# Patient Record
Sex: FEMALE | Race: ASIAN | NOT HISPANIC OR LATINO
[De-identification: names, ages, dates, MRNs, and addresses within clinical notes are randomized per-mention and may not be internally consistent; named-entity substitution may affect disease eponyms.]

---

## 2019-01-15 ENCOUNTER — APPOINTMENT (OUTPATIENT)
Dept: ORTHOPEDIC SURGERY | Facility: CLINIC | Age: 74
End: 2019-01-15
Payer: MEDICARE

## 2019-01-15 VITALS
HEART RATE: 73 BPM | SYSTOLIC BLOOD PRESSURE: 133 MMHG | HEIGHT: 63 IN | WEIGHT: 142 LBS | BODY MASS INDEX: 25.16 KG/M2 | DIASTOLIC BLOOD PRESSURE: 69 MMHG

## 2019-01-15 DIAGNOSIS — M65.341 TRIGGER FINGER, RIGHT RING FINGER: ICD-10-CM

## 2019-01-15 PROCEDURE — 99204 OFFICE O/P NEW MOD 45 MIN: CPT | Mod: 25

## 2019-01-15 PROCEDURE — 20550 NJX 1 TENDON SHEATH/LIGAMENT: CPT | Mod: RT

## 2019-01-15 NOTE — HISTORY OF PRESENT ILLNESS
[FreeTextEntry1] : the patient is a 73-year-old female who presents with pain and loss of motion of the right ring finger. She states that her symptoms began approximately a year and half ago and she received one cortisone injection at the A1 pulley which providedsignificant relief approximately 1 year. Currently she experiences a dull pain at the A1 pulley the radius the PIP joint. She is unable to make a full fist due to pain and stiffness.

## 2019-01-15 NOTE — PROCEDURE
[FreeTextEntry1] : My impression is that this patient has stenosing tenosynovitis of the finger, more commonly known as a trigger finger.  I recommended that the patient undergo a trigger finger injection. The risks, benefits, and alternatives were discussed with the patient in detail. This included (but was not limited to) pain, infection, subcutaneous atrophy, skin depigmentation, etc... They agreed to undergo the steroid injection. Under informed consent and sterile conditions, 1/2 cc of 1% plain lidocaine  and 1 cc of Dexamethasone 4mg was precisely injected into the patient's right ring finger.   A sterile Band-Aid was placed.  It is my hope that this significantly alleviates the patient's symptoms.\par \par

## 2019-01-15 NOTE — PHYSICAL EXAM
[Tinel's Sign Median Nerve At The Right Wrist] : negative Tinel's [Wrist Neurological Dysfunction Phalen's Maneuver Right] : negative Phalen's [Normal Finger/nose] : finger to nose coordination [Normal] : no peripheral adenopathy appreciated [de-identified] : Right ring finger:\par Skin intact no swelling no erythema no ecchymosis\par tenderness to palpation over the A1 pulley without active triggering on flexion and extension\par No other digits affected\par Full range of motion of the digits without pain\par sensation intact distally, capillary refill less than 2 seconds [de-identified] : soyr

## 2019-01-15 NOTE — ASSESSMENT
[FreeTextEntry1] : the patient is a 73-year-old female with pain at the A1 pulley of the right ring finger consistent with flexor tenosynovitis. She underwent a cortisone injection today which she tolerated well. She will continue activity as tolerated and range of motion exercises. She will followup if she does not have improvement in her symptoms.

## 2020-11-11 ENCOUNTER — APPOINTMENT (OUTPATIENT)
Dept: ORTHOPEDIC SURGERY | Facility: CLINIC | Age: 75
End: 2020-11-11
Payer: MEDICARE

## 2020-11-11 DIAGNOSIS — M65.342 TRIGGER FINGER, LEFT RING FINGER: ICD-10-CM

## 2020-11-11 PROCEDURE — 99213 OFFICE O/P EST LOW 20 MIN: CPT

## 2020-11-11 NOTE — HISTORY OF PRESENT ILLNESS
[FreeTextEntry1] : 75-year-old female returns today for followup of a left middle trigger finger. She had relief following her last injection however he pain and locking have returned and worsened over the past several weeks. Additionally she feels a small nodule at the A1 pulley that is tender to palpation.

## 2020-11-11 NOTE — REASON FOR VISIT
[Initial Visit] : an initial visit for [Trigger Finger] : trigger finger [FreeTextEntry2] : LT hand pain follow up.

## 2020-11-11 NOTE — ASSESSMENT
[FreeTextEntry1] : a 75-year-old female with complaints of left middle finger nodule with associated triggering. Risks and benefits of continued conservative treatment including cortisone injection versus surgical trigger finger release were discussed at length with the patient she would like to proceed with surgery. She'll be booked for a left middle trigger finger release and will continue activity as tolerated until that time.

## 2020-11-11 NOTE — PHYSICAL EXAM
[Normal LUE] : Left Upper Extremity: No scars, rashes, lesions, ulcers, skin intact [Normal Finger/nose] : finger to nose coordination [Normal] : no peripheral adenopathy appreciated [de-identified] : Left middle finger:\par Skin intact no swelling no erythema no ecchymosis\par tenderness to palpation over the A1 pulley with active triggering on flexion and extension, palpable nodule consistent with retinacular cyst\par No other digits affected\par Full range of motion of the digits without pain\par sensation intact distally, capillary refill less than 2 seconds

## 2020-11-17 DIAGNOSIS — Z01.818 ENCOUNTER FOR OTHER PREPROCEDURAL EXAMINATION: ICD-10-CM

## 2020-11-21 ENCOUNTER — TRANSCRIPTION ENCOUNTER (OUTPATIENT)
Age: 75
End: 2020-11-21

## 2020-11-23 NOTE — ASU PATIENT PROFILE, ADULT - PMH
Cardiac disorder    Diabetes mellitus    High cholesterol    Trigger finger  ring finger both hands

## 2020-11-23 NOTE — ASU PATIENT PROFILE, ADULT - PSH
No significant past surgical history Anterior dislocation of IOL (intraocular lens), bilateral    History of gallbladder disease  Cholecystectomy  History of trigger finger    S/P CABG (coronary artery bypass graft)

## 2020-11-24 ENCOUNTER — APPOINTMENT (OUTPATIENT)
Dept: ORTHOPEDIC SURGERY | Facility: HOSPITAL | Age: 75
End: 2020-11-24

## 2020-11-24 ENCOUNTER — OUTPATIENT (OUTPATIENT)
Dept: INPATIENT UNIT | Facility: HOSPITAL | Age: 75
LOS: 1 days | Discharge: HOME | End: 2020-11-24
Payer: MEDICARE

## 2020-11-24 VITALS
TEMPERATURE: 97 F | OXYGEN SATURATION: 100 % | HEIGHT: 64 IN | DIASTOLIC BLOOD PRESSURE: 61 MMHG | WEIGHT: 134.92 LBS | SYSTOLIC BLOOD PRESSURE: 156 MMHG | HEART RATE: 102 BPM | RESPIRATION RATE: 18 BRPM

## 2020-11-24 VITALS
HEART RATE: 72 BPM | DIASTOLIC BLOOD PRESSURE: 69 MMHG | OXYGEN SATURATION: 98 % | TEMPERATURE: 98 F | SYSTOLIC BLOOD PRESSURE: 172 MMHG | RESPIRATION RATE: 16 BRPM

## 2020-11-24 DIAGNOSIS — Z87.39 PERSONAL HISTORY OF OTHER DISEASES OF THE MUSCULOSKELETAL SYSTEM AND CONNECTIVE TISSUE: Chronic | ICD-10-CM

## 2020-11-24 DIAGNOSIS — H27.123: Chronic | ICD-10-CM

## 2020-11-24 DIAGNOSIS — M65.332 TRIGGER FINGER, LEFT MIDDLE FINGER: ICD-10-CM

## 2020-11-24 DIAGNOSIS — Z95.1 PRESENCE OF AORTOCORONARY BYPASS GRAFT: Chronic | ICD-10-CM

## 2020-11-24 DIAGNOSIS — Z87.19 PERSONAL HISTORY OF OTHER DISEASES OF THE DIGESTIVE SYSTEM: Chronic | ICD-10-CM

## 2020-11-24 PROCEDURE — 26160 REMOVE TENDON SHEATH LESION: CPT | Mod: F2

## 2020-11-24 PROCEDURE — 82962 GLUCOSE BLOOD TEST: CPT

## 2020-11-24 RX ORDER — LINAGLIPTIN 5 MG/1
1 TABLET, FILM COATED ORAL
Qty: 0 | Refills: 0 | DISCHARGE

## 2020-11-24 RX ORDER — OXYCODONE HYDROCHLORIDE 5 MG/1
1 TABLET ORAL
Qty: 20 | Refills: 0
Start: 2020-11-24 | End: 2020-11-30

## 2020-11-24 RX ORDER — SIMVASTATIN 20 MG/1
0 TABLET, FILM COATED ORAL
Qty: 0 | Refills: 0 | DISCHARGE

## 2020-11-24 RX ORDER — ASPIRIN/CALCIUM CARB/MAGNESIUM 324 MG
0 TABLET ORAL
Qty: 0 | Refills: 0 | DISCHARGE

## 2020-11-24 RX ORDER — RANITIDINE HYDROCHLORIDE 150 MG/1
0 TABLET, FILM COATED ORAL
Qty: 0 | Refills: 0 | DISCHARGE

## 2020-11-24 RX ORDER — METFORMIN HYDROCHLORIDE 850 MG/1
1 TABLET ORAL
Qty: 0 | Refills: 0 | DISCHARGE

## 2020-11-24 RX ORDER — METOPROLOL TARTRATE 50 MG
1 TABLET ORAL
Qty: 0 | Refills: 0 | DISCHARGE

## 2020-11-24 RX ORDER — ATORVASTATIN CALCIUM 80 MG/1
0 TABLET, FILM COATED ORAL
Qty: 0 | Refills: 0 | DISCHARGE

## 2020-11-24 RX ORDER — CLOPIDOGREL BISULFATE 75 MG/1
0 TABLET, FILM COATED ORAL
Qty: 0 | Refills: 0 | DISCHARGE

## 2020-11-24 RX ORDER — CLOPIDOGREL BISULFATE 75 MG/1
1 TABLET, FILM COATED ORAL
Qty: 0 | Refills: 0 | DISCHARGE

## 2020-11-24 NOTE — ASU DISCHARGE PLAN (ADULT/PEDIATRIC) - CARE PROVIDER_API CALL
Felicity Helms  [facility]  [address] [place] [address]  Phone: [phone]  Fax: [phone]  Follow Up Time:

## 2020-11-24 NOTE — ASU DISCHARGE PLAN (ADULT/PEDIATRIC) - CALL YOUR DOCTOR IF YOU HAVE ANY OF THE FOLLOWING:
Swelling that gets worse/Bleeding that does not stop/Pain not relieved by Medications/Numbness, tingling, color or temperature change to extremity/Wound/Surgical Site with redness, or foul smelling discharge or pus

## 2020-11-24 NOTE — ASU DISCHARGE PLAN (ADULT/PEDIATRIC) - ASU DC SPECIAL INSTRUCTIONSFT
- If you have a splint on, keep it on until you see Dr. Helms in the office. Do not get the splint wet at all.    -Elevate hand as much as possible and wiggle fingers as much as you can, as often as you think of it (wiggle 10 times every commercial  if you are watching TV, or reading a book after every 5 pages read). The exception is if you have a splint you will not be able to wiggle the affected digit. Try to wiggle the free ones though.    -Walk plenty, no sitting around.    -Call office to schedule an appointment in 10-14 days. You will have you wound checked then, any sutures will be removed, and your splint (if you have one on) will be changed.

## 2020-12-01 ENCOUNTER — APPOINTMENT (OUTPATIENT)
Dept: ORTHOPEDIC SURGERY | Facility: CLINIC | Age: 75
End: 2020-12-01
Payer: MEDICARE

## 2020-12-01 VITALS
WEIGHT: 142 LBS | HEART RATE: 70 BPM | HEIGHT: 63 IN | SYSTOLIC BLOOD PRESSURE: 124 MMHG | DIASTOLIC BLOOD PRESSURE: 64 MMHG | BODY MASS INDEX: 25.16 KG/M2

## 2020-12-01 DIAGNOSIS — Z79.02 LONG TERM (CURRENT) USE OF ANTITHROMBOTICS/ANTIPLATELETS: ICD-10-CM

## 2020-12-01 DIAGNOSIS — J44.9 CHRONIC OBSTRUCTIVE PULMONARY DISEASE, UNSPECIFIED: ICD-10-CM

## 2020-12-01 DIAGNOSIS — M19.90 UNSPECIFIED OSTEOARTHRITIS, UNSPECIFIED SITE: ICD-10-CM

## 2020-12-01 DIAGNOSIS — M65.332 TRIGGER FINGER, LEFT MIDDLE FINGER: ICD-10-CM

## 2020-12-01 DIAGNOSIS — Z98.890 OTHER SPECIFIED POSTPROCEDURAL STATES: ICD-10-CM

## 2020-12-01 DIAGNOSIS — I25.10 ATHEROSCLEROTIC HEART DISEASE OF NATIVE CORONARY ARTERY WITHOUT ANGINA PECTORIS: ICD-10-CM

## 2020-12-01 DIAGNOSIS — I50.9 HEART FAILURE, UNSPECIFIED: ICD-10-CM

## 2020-12-01 DIAGNOSIS — E78.00 PURE HYPERCHOLESTEROLEMIA, UNSPECIFIED: ICD-10-CM

## 2020-12-01 DIAGNOSIS — D64.9 ANEMIA, UNSPECIFIED: ICD-10-CM

## 2020-12-01 DIAGNOSIS — E11.40 TYPE 2 DIABETES MELLITUS WITH DIABETIC NEUROPATHY, UNSPECIFIED: ICD-10-CM

## 2020-12-01 DIAGNOSIS — Z95.5 PRESENCE OF CORONARY ANGIOPLASTY IMPLANT AND GRAFT: ICD-10-CM

## 2020-12-01 DIAGNOSIS — M67.844 OTHER SPECIFIED DISORDERS OF TENDON, LEFT HAND: ICD-10-CM

## 2020-12-01 DIAGNOSIS — Z79.82 LONG TERM (CURRENT) USE OF ASPIRIN: ICD-10-CM

## 2020-12-01 DIAGNOSIS — Z90.49 ACQUIRED ABSENCE OF OTHER SPECIFIED PARTS OF DIGESTIVE TRACT: ICD-10-CM

## 2020-12-01 DIAGNOSIS — Z79.4 LONG TERM (CURRENT) USE OF INSULIN: ICD-10-CM

## 2020-12-01 DIAGNOSIS — F32.9 MAJOR DEPRESSIVE DISORDER, SINGLE EPISODE, UNSPECIFIED: ICD-10-CM

## 2020-12-01 DIAGNOSIS — I11.0 HYPERTENSIVE HEART DISEASE WITH HEART FAILURE: ICD-10-CM

## 2020-12-01 PROCEDURE — 99024 POSTOP FOLLOW-UP VISIT: CPT

## 2020-12-01 NOTE — HISTORY OF PRESENT ILLNESS
[de-identified] : DOS: 11/24/2020\par Procedure: Left Middle Trigger Finger Release [de-identified] : 12/01/2020: The patient presents to the office today for her first postop visit after a left middle trigger finger release. Overall the patient is doing well but she still has some stiffness and discomfort at the middle finger and incision site. She denies any fevers or chills. [de-identified] : Left Hand Exam\par \par Skin is intact with a well-healing incision to the A1 pulley of the hand. There is no evidence of infection. Wrist and digit range of motion is intact with flexion and extension, but slightly diminished secondary to stiffness. There is no active triggering noted. Sensation is grossly intact and capillary refill is brisk. [de-identified] : Note x-ray images were obtained at this visit. [de-identified] : POD 7 s/p Left Middle Trigger Finger Release [de-identified] : The patient is now 7 days removed from a left middle trigger finger release. The patient is healing well and has been working on range of motion exercises. I recommend advancing activity from light activity to activity as tolerated at this time. The patient will continue to keep their incision(s) clean at all times and work on range of motion to prevent stiffness. They will follow up as needed. The patient is in agreement with this plan.

## 2021-01-21 ENCOUNTER — TRANSCRIPTION ENCOUNTER (OUTPATIENT)
Age: 76
End: 2021-01-21

## 2022-03-26 PROBLEM — E78.00 PURE HYPERCHOLESTEROLEMIA, UNSPECIFIED: Chronic | Status: ACTIVE | Noted: 2020-11-23

## 2022-03-26 PROBLEM — I51.9 HEART DISEASE, UNSPECIFIED: Chronic | Status: ACTIVE | Noted: 2020-11-23

## 2022-03-26 PROBLEM — E11.9 TYPE 2 DIABETES MELLITUS WITHOUT COMPLICATIONS: Chronic | Status: ACTIVE | Noted: 2020-11-23

## 2022-03-26 PROBLEM — M65.30 TRIGGER FINGER, UNSPECIFIED FINGER: Chronic | Status: ACTIVE | Noted: 2020-11-23

## 2022-04-13 ENCOUNTER — APPOINTMENT (OUTPATIENT)
Dept: ORTHOPEDIC SURGERY | Facility: CLINIC | Age: 77
End: 2022-04-13

## 2022-04-21 ENCOUNTER — APPOINTMENT (OUTPATIENT)
Dept: PAIN MANAGEMENT | Facility: CLINIC | Age: 77
End: 2022-04-21

## 2023-10-02 ENCOUNTER — APPOINTMENT (OUTPATIENT)
Dept: PAIN MANAGEMENT | Facility: CLINIC | Age: 78
End: 2023-10-02